# Patient Record
Sex: MALE | URBAN - METROPOLITAN AREA
[De-identification: names, ages, dates, MRNs, and addresses within clinical notes are randomized per-mention and may not be internally consistent; named-entity substitution may affect disease eponyms.]

---

## 2017-06-09 ENCOUNTER — NEW PATIENT (OUTPATIENT)
Dept: URBAN - METROPOLITAN AREA CLINIC 44 | Facility: CLINIC | Age: 77
End: 2017-06-09
Payer: COMMERCIAL

## 2017-06-09 DIAGNOSIS — H04.123 TEAR FILM INSUFFICIENCY OF BILATERAL LACRIMAL GLANDS: ICD-10-CM

## 2017-06-09 DIAGNOSIS — E10.39 TYPE 1 DIABETES MELLITUS WITH OPHTHALMIC COMPLICAT: Primary | ICD-10-CM

## 2017-06-09 DIAGNOSIS — H16.223 KERATOCONJUNCTIVITIS SICCA, BILATERAL: ICD-10-CM

## 2017-06-09 PROCEDURE — 92250 FUNDUS PHOTOGRAPHY W/I&R: CPT | Performed by: OPHTHALMOLOGY

## 2017-06-09 PROCEDURE — 92004 COMPRE OPH EXAM NEW PT 1/>: CPT | Performed by: OPHTHALMOLOGY

## 2017-06-09 RX ORDER — OLOPATADINE HYDROCHLORIDE 7 MG/ML
0.7 % SOLUTION OPHTHALMIC
Qty: 53 | Refills: 0 | Status: ACTIVE
Start: 2017-06-09

## 2017-06-09 ASSESSMENT — INTRAOCULAR PRESSURE
OS: 14
OD: 13

## 2017-06-09 ASSESSMENT — KERATOMETRY
OD: 48.13
OS: 48.75

## 2017-06-09 ASSESSMENT — VISUAL ACUITY
OD: 20/30
OS: 20/40

## 2021-01-21 ENCOUNTER — OFFICE VISIT (OUTPATIENT)
Dept: URBAN - METROPOLITAN AREA CLINIC 54 | Facility: CLINIC | Age: 81
End: 2021-01-21
Payer: MEDICARE

## 2021-01-21 DIAGNOSIS — H43.11 VITREOUS HEMORRHAGE, RIGHT EYE: ICD-10-CM

## 2021-01-21 PROCEDURE — 99213 OFFICE O/P EST LOW 20 MIN: CPT | Performed by: OPHTHALMOLOGY

## 2021-01-21 PROCEDURE — 92134 CPTRZ OPH DX IMG PST SGM RTA: CPT | Performed by: OPHTHALMOLOGY

## 2021-01-21 ASSESSMENT — INTRAOCULAR PRESSURE
OD: 17
OS: 16

## 2021-01-21 NOTE — IMPRESSION/PLAN
Impression: Type 2 diab with prolif diab rtnop without macular edema, bi: [id number].
-s/p PRP OU
-s/p focal laser OU
s/p Avastin OD 11/5/18 OCT: 01/21/21 OD: macular atrophy, drusen, no ME
OS: Flat, no DME Plan: Thorough examination reveals quiescent proliferative diabetic retinopathy following panretinal photocoagulation. The importance of blood sugar, blood pressure, and cholesterol control; and their relationship to progression of diabetic retinopathy were reviewed with the patient. The patient was urged to work closely with their PCP to avoid systemic complications of diabetic disease. 

RTC 6-8 months DFE OU OCT OU

## 2021-07-22 ENCOUNTER — OFFICE VISIT (OUTPATIENT)
Dept: URBAN - METROPOLITAN AREA CLINIC 54 | Facility: CLINIC | Age: 81
End: 2021-07-22
Payer: MEDICARE

## 2021-07-22 DIAGNOSIS — E11.3593 TYPE 2 DIAB WITH PROLIF DIAB RTNOP WITHOUT MACULAR EDEMA, BI: Primary | ICD-10-CM

## 2021-07-22 PROCEDURE — 99213 OFFICE O/P EST LOW 20 MIN: CPT | Performed by: OPHTHALMOLOGY

## 2021-07-22 PROCEDURE — 92134 CPTRZ OPH DX IMG PST SGM RTA: CPT | Performed by: OPHTHALMOLOGY

## 2021-07-22 ASSESSMENT — INTRAOCULAR PRESSURE
OD: 19
OS: 20

## 2021-07-22 NOTE — IMPRESSION/PLAN
Impression: Type 2 diab with prolif diab rtnop without macular edema, bi: [id number].
-s/p PRP OU
-s/p focal laser OU
s/p Avastin OD 11/5/18 OCT: 7/22/21 OD: macular atrophy, drusen, no ME
OS: Flat, no DME Plan: Thorough examination reveals quiescent proliferative diabetic retinopathy following panretinal photocoagulation. The importance of blood sugar, blood pressure, and cholesterol control; and their relationship to progression of diabetic retinopathy were reviewed with the patient. The patient was urged to work closely with their PCP to avoid systemic complications of diabetic disease. 

RTC 6-8 months DFE OU OCT OU

## 2022-01-27 ENCOUNTER — OFFICE VISIT (OUTPATIENT)
Dept: URBAN - METROPOLITAN AREA CLINIC 54 | Facility: CLINIC | Age: 82
End: 2022-01-27
Payer: MEDICARE

## 2022-01-27 PROCEDURE — 92134 CPTRZ OPH DX IMG PST SGM RTA: CPT | Performed by: OPHTHALMOLOGY

## 2022-01-27 PROCEDURE — 99213 OFFICE O/P EST LOW 20 MIN: CPT | Performed by: OPHTHALMOLOGY

## 2022-01-27 ASSESSMENT — INTRAOCULAR PRESSURE
OS: 17
OD: 15

## 2022-01-27 NOTE — IMPRESSION/PLAN
Impression: Type 2 diab with prolif diab rtnop without macular edema, bi: [id number].
-s/p PRP OU
-s/p focal laser OU
s/p Avastin OD 11/5/18 OCT: 01/27/22 OD: macular atrophy, drusen, no ME
OS: Flat, no DME Plan: Thorough examination reveals quiescent proliferative diabetic retinopathy following panretinal photocoagulation. The importance of blood sugar, blood pressure, and cholesterol control; and their relationship to progression of diabetic retinopathy were reviewed with the patient. The patient was urged to work closely with their PCP to avoid systemic complications of diabetic disease. 

RTC 6-8 months DFE OU OCT OU

## 2022-07-28 ENCOUNTER — OFFICE VISIT (OUTPATIENT)
Dept: URBAN - METROPOLITAN AREA CLINIC 54 | Facility: CLINIC | Age: 82
End: 2022-07-28
Payer: MEDICARE

## 2022-07-28 DIAGNOSIS — E11.3593 TYPE 2 DIAB WITH PROLIF DIAB RTNOP WITHOUT MACULAR EDEMA, BI: Primary | ICD-10-CM

## 2022-07-28 DIAGNOSIS — H43.11 VITREOUS HEMORRHAGE, RIGHT EYE: ICD-10-CM

## 2022-07-28 PROCEDURE — 92134 CPTRZ OPH DX IMG PST SGM RTA: CPT | Performed by: OPHTHALMOLOGY

## 2022-07-28 PROCEDURE — 99213 OFFICE O/P EST LOW 20 MIN: CPT | Performed by: OPHTHALMOLOGY

## 2022-07-28 ASSESSMENT — INTRAOCULAR PRESSURE
OD: 14
OS: 15

## 2022-07-28 NOTE — IMPRESSION/PLAN
Impression: Type 2 diab with prolif diab rtnop without macular edema, bi: [id number].
-s/p PRP OU
-s/p focal laser OU
s/p Avastin OD 11/5/18 OCT: 07/28/22 OD: macular atrophy, drusen, no ME
OS: Flat, no DME Plan: Thorough examination reveals quiescent proliferative diabetic retinopathy following panretinal photocoagulation. The importance of blood sugar, blood pressure, and cholesterol control; and their relationship to progression of diabetic retinopathy were reviewed with the patient. The patient was urged to work closely with their PCP to avoid systemic complications of diabetic disease. 

RTC 6-8 months DFE OU OCT OU